# Patient Record
Sex: MALE | Race: WHITE | NOT HISPANIC OR LATINO | URBAN - METROPOLITAN AREA
[De-identification: names, ages, dates, MRNs, and addresses within clinical notes are randomized per-mention and may not be internally consistent; named-entity substitution may affect disease eponyms.]

---
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
## 2021-03-21 ENCOUNTER — EMERGENCY (EMERGENCY)
Facility: HOSPITAL | Age: 21
LOS: 1 days | Discharge: HOME | End: 2021-03-21
Attending: EMERGENCY MEDICINE
Payer: COMMERCIAL

## 2021-03-21 VITALS
SYSTOLIC BLOOD PRESSURE: 115 MMHG | OXYGEN SATURATION: 99 % | DIASTOLIC BLOOD PRESSURE: 73 MMHG | RESPIRATION RATE: 18 BRPM | HEART RATE: 83 BPM

## 2021-03-21 VITALS
OXYGEN SATURATION: 97 % | RESPIRATION RATE: 18 BRPM | DIASTOLIC BLOOD PRESSURE: 67 MMHG | SYSTOLIC BLOOD PRESSURE: 130 MMHG | HEART RATE: 112 BPM | TEMPERATURE: 98 F | WEIGHT: 149.91 LBS | HEIGHT: 66 IN

## 2021-03-21 PROCEDURE — 70160 X-RAY EXAM OF NASAL BONES: CPT

## 2021-03-21 PROCEDURE — 99284 EMERGENCY DEPT VISIT MOD MDM: CPT

## 2021-03-21 PROCEDURE — 70160 X-RAY EXAM OF NASAL BONES: CPT | Mod: 26

## 2021-03-21 PROCEDURE — 99283 EMERGENCY DEPT VISIT LOW MDM: CPT | Mod: 25

## 2021-03-21 RX ORDER — ACETAMINOPHEN 500 MG
650 TABLET ORAL ONCE
Refills: 0 | Status: COMPLETED | OUTPATIENT
Start: 2021-03-21 | End: 2021-03-21

## 2021-03-21 RX ADMIN — Medication 650 MILLIGRAM(S): at 22:13

## 2021-03-21 NOTE — ED PROVIDER NOTE - CARE PROVIDER_API CALL
Errol Renteria  PLASTIC SURGERY  [address] [place] [address]  Phone: [phone]  Fax: [phone]  Follow Up Time: 4-6 Days
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
## 2021-03-21 NOTE — ED PROVIDER NOTE - PHYSICAL EXAMINATION
CONSTITUTIONAL: Well-developed; well-nourished; in no acute distress.   SKIN: warm, dry  HEAD: Normocephalic; atraumatic.  EYES: no conjunctival injection. PERRL. EOMI.   ENT: airway clear. +swelling and erythema over bridge of nose, TTP as well. Dried blood seen in nares. No hematoma in nostrils seen. No blood in posterior pharynx. TM's WNL.   NECK: Supple; non tender.  CARD: S1, S2 normal; no murmurs, gallops, or rubs. Regular rate and rhythm.   RESP: No wheezes, rales or rhonchi. Good air movement bilaterally.   EXT: Ambulates independently.  No cyanosis or edema.   NEURO: Alert, oriented, grossly unremarkable. CN III-xII intact. Motor strength 5/5 in all extremities. Sensation intact distally.   PSYCH: Cooperative, appropriate. CONSTITUTIONAL: Well-developed; well-nourished; in no acute distress.   SKIN: warm, dry  HEAD: Normocephalic; atraumatic.  EYES: no conjunctival injection. PERRL. EOMI.   ENT: airway clear. +swelling and erythema over bridge of nose, TTP as well. Dried blood seen in nares. No hematoma in nostrils seen. No blood in posterior pharynx. TM's WNL.   NECK: Supple; non tender.  CARD: S1, S2 normal; no murmurs, gallops, or rubs. Regular rate and rhythm.   RESP: No wheezes, rales or rhonchi. Good air movement bilaterally.   EXT: Ambulates independently.  No cyanosis or edema.   NEURO: Alert, oriented, grossly unremarkable. CN III-xII intact. Motor strength 5/5 in all extremities. Sensation intact distally.   PSYCH: Cooperative, appropriate.     attn - alert, nad, swelling and tenderness of nose and dried blood.  no septal hematoma.  PERRL 3 mm, EOMI without nystagmus.  neck-, Neuro - intact and non focal.  VOMS+, NPC at >10cm.

## 2021-03-21 NOTE — ED PROVIDER NOTE - PATIENT PORTAL LINK FT
67
You can access the FollowMyHealth Patient Portal offered by [facility] by registering at the following website: [email or web address]. By joining [employer]’s FollowMyHealth portal, you will also be able to view your health information using other applications (apps) compatible with our system.

## 2021-03-21 NOTE — ED PROVIDER NOTE - NSFOLLOWUPINSTRUCTIONS_ED_ALL_ED_FT
Follow up with Concussion Management Program for [facility] for further evaluation of possible concussion [phone]  Follow up with Dr. Errol Renteria - plastic surgeon if nose is crooked after swelling is resolved. Follow up with Concussion Management Program for [facility] for further evaluation of possible concussion [phone]    Follow up with Dr. Errol Renteria - plastic surgeon if nose is crooked after swelling is resolved.    Take Tylenol and/or Motrin as needed for pain per the dosing instructions on the bottle.      Nasal Fracture    WHAT YOU NEED TO KNOW:    A nasal fracture is a crack or break in your nose. You may have a break in the upper nose (bridge), the side, or the septum. The septum is in the middle of the nose and divides your nostrils.    DISCHARGE INSTRUCTIONS:    Return to the emergency department if:   •You feel like one or both of your nasal passages are blocked and you have trouble breathing.  •Clear fluid is leaking from your nose.  •You have severe nose pain, even after you take medicine.  •You have double vision or have problems moving your eyes.    Call your doctor if:   •You have a fever.  •You continue to have nosebleeds.  •You have a headache that gets worse, even after you take pain medicine.
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
## 2021-03-21 NOTE — ED PROVIDER NOTE - NS ED ROS FT
CONSTITUTIONAL - No fever, No diaphoresis, No weight change  SKIN - No rash  HEMATOLOGIC - No abnormal bleeding or bruising  EYES - No eye pain, No blurred vision  ENT - No change in hearing, No sore throat, No neck pain, No rhinorrhea, No ear pain, +nose pain   RESPIRATORY - No shortness of breath, No cough  CARDIAC -No chest pain, No palpitations  GI - No abdominal pain, No nausea, No vomiting, No diarrhea, No constipation  MUSCULOSKELETAL - No joint pain, No swelling, No back pain  NEUROLOGIC - No numbness, No focal weakness, No headache, No dizziness

## 2021-03-22 PROBLEM — Z78.9 OTHER SPECIFIED HEALTH STATUS: Chronic | Status: ACTIVE | Noted: 2020-03-18
